# Patient Record
Sex: FEMALE | Race: WHITE
[De-identification: names, ages, dates, MRNs, and addresses within clinical notes are randomized per-mention and may not be internally consistent; named-entity substitution may affect disease eponyms.]

---

## 2020-03-02 ENCOUNTER — HOSPITAL ENCOUNTER (EMERGENCY)
Dept: HOSPITAL 62 - ER | Age: 20
Discharge: HOME | End: 2020-03-02
Payer: OTHER GOVERNMENT

## 2020-03-02 VITALS — DIASTOLIC BLOOD PRESSURE: 65 MMHG | SYSTOLIC BLOOD PRESSURE: 121 MMHG

## 2020-03-02 DIAGNOSIS — R11.0: ICD-10-CM

## 2020-03-02 DIAGNOSIS — R51: ICD-10-CM

## 2020-03-02 DIAGNOSIS — Z88.0: ICD-10-CM

## 2020-03-02 DIAGNOSIS — R10.13: Primary | ICD-10-CM

## 2020-03-02 LAB
ADD MANUAL DIFF: NO
ALBUMIN SERPL-MCNC: 4.4 G/DL (ref 3.5–5)
ALP SERPL-CCNC: 37 U/L (ref 38–126)
ANION GAP SERPL CALC-SCNC: 9 MMOL/L (ref 5–19)
APPEARANCE UR: CLEAR
APTT PPP: YELLOW S
AST SERPL-CCNC: 22 U/L (ref 14–36)
BASOPHILS # BLD AUTO: 0 10^3/UL (ref 0–0.2)
BASOPHILS NFR BLD AUTO: 0.6 % (ref 0–2)
BILIRUB DIRECT SERPL-MCNC: 0.2 MG/DL (ref 0–0.4)
BILIRUB SERPL-MCNC: 0.7 MG/DL (ref 0.2–1.3)
BILIRUB UR QL STRIP: NEGATIVE
BUN SERPL-MCNC: 18 MG/DL (ref 7–20)
CALCIUM: 9 MG/DL (ref 8.4–10.2)
CHLORIDE SERPL-SCNC: 103 MMOL/L (ref 98–107)
CO2 SERPL-SCNC: 26 MMOL/L (ref 22–30)
EOSINOPHIL # BLD AUTO: 0.1 10^3/UL (ref 0–0.6)
EOSINOPHIL NFR BLD AUTO: 1.3 % (ref 0–6)
ERYTHROCYTE [DISTWIDTH] IN BLOOD BY AUTOMATED COUNT: 12.8 % (ref 11.5–14)
GLUCOSE SERPL-MCNC: 77 MG/DL (ref 75–110)
GLUCOSE UR STRIP-MCNC: NEGATIVE MG/DL
HCT VFR BLD CALC: 37.9 % (ref 36–47)
HGB BLD-MCNC: 13.3 G/DL (ref 12–15.5)
KETONES UR STRIP-MCNC: 20 MG/DL
LYMPHOCYTES # BLD AUTO: 2.2 10^3/UL (ref 0.5–4.7)
LYMPHOCYTES NFR BLD AUTO: 38.9 % (ref 13–45)
MCH RBC QN AUTO: 32.2 PG (ref 27–33.4)
MCHC RBC AUTO-ENTMCNC: 35.1 G/DL (ref 32–36)
MCV RBC AUTO: 92 FL (ref 80–97)
MONOCYTES # BLD AUTO: 0.3 10^3/UL (ref 0.1–1.4)
MONOCYTES NFR BLD AUTO: 5.6 % (ref 3–13)
NEUTROPHILS # BLD AUTO: 3 10^3/UL (ref 1.7–8.2)
NEUTS SEG NFR BLD AUTO: 53.6 % (ref 42–78)
NITRITE UR QL STRIP: NEGATIVE
PH UR STRIP: 5 [PH] (ref 5–9)
PLATELET # BLD: 305 10^3/UL (ref 150–450)
POTASSIUM SERPL-SCNC: 3.6 MMOL/L (ref 3.6–5)
PROT SERPL-MCNC: 7.3 G/DL (ref 6.3–8.2)
PROT UR STRIP-MCNC: NEGATIVE MG/DL
RBC # BLD AUTO: 4.13 10^6/UL (ref 3.72–5.28)
SP GR UR STRIP: 1.03
TOTAL CELLS COUNTED % (AUTO): 100 %
UROBILINOGEN UR-MCNC: NEGATIVE MG/DL (ref ?–2)
WBC # BLD AUTO: 5.6 10^3/UL (ref 4–10.5)

## 2020-03-02 PROCEDURE — 96361 HYDRATE IV INFUSION ADD-ON: CPT

## 2020-03-02 PROCEDURE — 80053 COMPREHEN METABOLIC PANEL: CPT

## 2020-03-02 PROCEDURE — 84703 CHORIONIC GONADOTROPIN ASSAY: CPT

## 2020-03-02 PROCEDURE — 85025 COMPLETE CBC W/AUTO DIFF WBC: CPT

## 2020-03-02 PROCEDURE — 96374 THER/PROPH/DIAG INJ IV PUSH: CPT

## 2020-03-02 PROCEDURE — 81001 URINALYSIS AUTO W/SCOPE: CPT

## 2020-03-02 PROCEDURE — 83690 ASSAY OF LIPASE: CPT

## 2020-03-02 PROCEDURE — 99283 EMERGENCY DEPT VISIT LOW MDM: CPT

## 2020-03-02 PROCEDURE — 36415 COLL VENOUS BLD VENIPUNCTURE: CPT

## 2020-03-02 PROCEDURE — 96375 TX/PRO/DX INJ NEW DRUG ADDON: CPT

## 2020-03-02 NOTE — ER DOCUMENT REPORT
ED General





- General


Chief Complaint: Headache >24 hrs old


Stated Complaint: HEADACHE,ABDOMINAL PAIN,NAUSEA


Time Seen by Provider: 03/02/20 15:32


Primary Care Provider: 


TIGRE HOLLEY PA-C [Primary Care Provider] - Follow up in 3-5 days


Notes: 





18 y/o female with history of migraine headaches presents for left sided 

headache for 1 month and abdominal pain since this morning. Pt states migraine 

headache has associated nausea, photophobia, and phonophobia. States feels like 

usual migraine. Pt states abdominal pain started off generalized this morning 

and has progressed to epigastric area. Associated nausea. Denies vomiting, 

diarrhea, constipation, fever, urinary symptoms. 


TRAVEL OUTSIDE OF THE [place] IN LAST 30 DAYS: No





- Related Data


Allergies/Adverse Reactions: 


                                        





amoxicillin Allergy (Verified 03/02/20 15:30)


   








Home Medications: adderall.  oral contraceptive





Past Medical History





- General


Information source: Patient





- Social History


Smoking Status: Never Smoker


Frequency of alcohol use: None


Drug Abuse: Marijuana


Family History: Other - did not ask


Patient has suicidal ideation: No


Patient has homicidal ideation: No





Review of Systems





- Review of Systems


Notes: 





Constitutional: Negative for fever.


HENT: Negative for sore throat.


Eyes: Negative for visual changes.


Cardiovascular: Negative for chest pain.


Respiratory: Negative for shortness of breath.


Gastrointestinal: Positive for abdominal pain and nausea. Negative for vomiting 

or diarrhea.


Genitourinary: Negative for dysuria.


Musculoskeletal: Negative for back pain.


Skin: Negative for rash.


Neurological: Positive for headaches. Negative for weakness or numbness.





10 point ROS negative except as marked above and in HPI.





Physical Exam





- Vital signs


Vitals: 


                                        











Temp Pulse Resp BP Pulse Ox


 


 98.2 F   73   20   118/66   100 


 


 03/02/20 14:39  03/02/20 14:39  03/02/20 14:39  03/02/20 14:39  03/02/20 14:39














- Notes


Notes: 





GENERAL: Well-appearing, well-nourished and in no acute distress.


HEAD: Atraumatic, normocephalic.


EYES: Pupils equal round and reactive to light, extraocular movements intact, 

sclera anicteric, conjunctiva are normal.


NECK: Normal range of motion, supple without lymphadenopathy or JVD.


ABDOMEN: Soft, nontender.  No guarding, no rebound.  No masses appreciated.


EXTREMITIES: Normal range of motion, no pitting or edema.  No clubbing or 

cyanosis.


NEUROLOGICAL: Cranial nerves II through XII grossly intact.  Normal speech, 

normal gait. PERRLA. EOM intact. No facial droop. No tongue deviation.


PSYCH: Normal mood, normal affect.


SKIN: Warm, Dry, normal turgor, no rashes or lesions noted.





Course





- Re-evaluation


Re-evalutation: 





03/02/20 Presentation of a headache that appears to be most consistent with 

tension versus migrainous type headache.  Headache was not maximal in onset, 

patient has no focal neurologic deficits, no nuchal rigidity, vital signs within

normal limits, no papilledema, and patient is overall well in appearance.  Based

on clinical history and examination I do not suspect an acute subarachnoid 

hemorrhage, dural venous sinus thrombosis, acute meningitis, or intercranial 

mass.  Given my low clinical suspicion for any acute life-threatening etiology, 

I do not feel advanced neuro imaging or laboratory testing is indicated at this 

time.  Will proceed with headache cocktail and reassess.





Presentation for abdominal pain and nausea. Abd soft, nontender. PE is otherwise

unremarkable. Has improved with zofran. Labwork is unremarkable. 





03/02/20 18:42 HA is relieved. Pt given prescriptions for ibuprofen, bentyl, and

zofran. Pt given strict return precautions and close follow up with PCP. All 

questions/concerns addressed prior to discharge. All results were discussed with

pt prior to discharge.








- Vital Signs


Vital signs: 


                                        











Temp Pulse Resp BP Pulse Ox


 


 98.5 F   92   18   121/65   100 


 


 03/02/20 19:09  03/02/20 19:09  03/02/20 19:09  03/02/20 19:09  03/02/20 19:09














- Laboratory


Result Diagrams: 


                                 03/02/20 15:48





                                 03/02/20 15:48


Laboratory results interpreted by me: 


                                        











  03/02/20 03/02/20





  15:48 15:48


 


Alkaline Phosphatase  37 L 


 


Urine Ketones   20 H


 


Urine Ascorbic Acid   20 H














Discharge





- Discharge


Clinical Impression: 


 Nausea





Headache


Qualifiers:


 Headache type: unspecified Headache chronicity pattern: acute headache 

Intractability: not intractable Qualified Code(s): R51 - Headache





Abdominal pain


Qualifiers:


 Abdominal location: epigastric Qualified Code(s): R10.13 - Epigastric pain





Condition: Stable


Disposition: HOME, SELF-CARE


Instructions:  Abdominal Pain ([facility]), Headache ([facility])


Additional Instructions: 


Please take ibuprofen as prescribed. Take Zofran as needed for nausea/vomiting. 

Take Bentyl as needed for abdominal pain. Follow up with your primary care 

doctor or with one of the clinics listed in 3 to 5 days.  Return immediately to 

ER if you start having any worsening symptoms, including worsening headache, 

nausea/vomiting, fever, neck pain, neck stiffness, abdominal pain, chest pain, 

shortness of breath, or any other symptoms concerning to you.


Prescriptions: 


Ondansetron [Zofran Odt 4 mg Tablet] 4 mg PO Q4HP PRN #30 tab.rapdis


 PRN Reason: 


Dicyclomine HCl [Bentyl 20 mg Tablet] 20 mg PO QID #40 tablet


Ibuprofen [Motrin 800 mg Tablet] 800 mg PO Q8H PRN #30 tab


 PRN Reason: 


Forms:  Return to Work


Referrals: 


TIGRE HOLLEY PA-C [Primary Care Provider] - Follow up in 3-5 days

## 2020-03-02 NOTE — ER DOCUMENT REPORT
ED Medical Screen (RME)





- General


Chief Complaint: Headache


Stated Complaint: HEADACHE,ABDOMINAL PAIN,NAUSEA


Time Seen by Provider: 03/02/20 15:32


Primary Care Provider: 


TIGRE HOLLEY PA-C [Primary Care Provider] - Follow up as needed


Notes: 





Patient is a 19-year-old female who presents the emergency department with a 

chief complaint of a headache and mid upper abdominal pain.  Patient states that

she has had her headache for the past month.  States that the headache is in her

left forehead.  Patient states that she also feels nauseous.  She states that 

she is 1 week late for her menstrual cycle.





Exam: Soft, mildly tender mid upper abdomen.





I have greeted and performed a rapid initial assessment of this patient.  A 

comprehensive ED assessment and evaluation of the patient, analysis of test 

results and completion of medical decision making process will be conducted by 

an additional ED providers.





- Related Data


Allergies/Adverse Reactions: 


                                        





amoxicillin Allergy (Verified 03/02/20 15:30)


   











Physical Exam





- Vital signs


Vitals: 





                                        











Temp Pulse Resp BP Pulse Ox


 


 98.2 F   73   20   118/66   100 


 


 03/02/20 14:39  03/02/20 14:39  03/02/20 14:39  03/02/20 14:39  03/02/20 14:39














Course





- Vital Signs


Vital signs: 





                                        











Temp Pulse Resp BP Pulse Ox


 


 98.2 F   73   20   118/66   100 


 


 03/02/20 14:39  03/02/20 14:39  03/02/20 14:39  03/02/20 14:39  03/02/20 14:39














Doctor's Discharge





- Discharge


Referrals: 


TIGRE HOLLEY PA-C [Primary Care Provider] - Follow up as needed

## 2020-03-04 ENCOUNTER — HOSPITAL ENCOUNTER (EMERGENCY)
Dept: HOSPITAL 62 - ER | Age: 20
Discharge: HOME | End: 2020-03-04
Payer: OTHER GOVERNMENT

## 2020-03-04 VITALS — DIASTOLIC BLOOD PRESSURE: 57 MMHG | SYSTOLIC BLOOD PRESSURE: 111 MMHG

## 2020-03-04 DIAGNOSIS — R10.13: ICD-10-CM

## 2020-03-04 DIAGNOSIS — R11.0: ICD-10-CM

## 2020-03-04 DIAGNOSIS — Z88.0: ICD-10-CM

## 2020-03-04 DIAGNOSIS — R51: Primary | ICD-10-CM

## 2020-03-04 DIAGNOSIS — M94.0: ICD-10-CM

## 2020-03-04 LAB
ADD MANUAL DIFF: NO
ALBUMIN SERPL-MCNC: 4.6 G/DL (ref 3.5–5)
ALP SERPL-CCNC: 43 U/L (ref 38–126)
ANION GAP SERPL CALC-SCNC: 12 MMOL/L (ref 5–19)
AST SERPL-CCNC: 21 U/L (ref 14–36)
BASOPHILS # BLD AUTO: 0 10^3/UL (ref 0–0.2)
BASOPHILS NFR BLD AUTO: 0.6 % (ref 0–2)
BILIRUB DIRECT SERPL-MCNC: 0.2 MG/DL (ref 0–0.4)
BILIRUB SERPL-MCNC: 0.7 MG/DL (ref 0.2–1.3)
BUN SERPL-MCNC: 9 MG/DL (ref 7–20)
CALCIUM: 9.4 MG/DL (ref 8.4–10.2)
CHLORIDE SERPL-SCNC: 101 MMOL/L (ref 98–107)
CO2 SERPL-SCNC: 24 MMOL/L (ref 22–30)
D DIMER PPP FEU-MCNC: < 0.27 UG/ML (ref 0–0.5)
EOSINOPHIL # BLD AUTO: 0.1 10^3/UL (ref 0–0.6)
EOSINOPHIL NFR BLD AUTO: 1.4 % (ref 0–6)
ERYTHROCYTE [DISTWIDTH] IN BLOOD BY AUTOMATED COUNT: 12.6 % (ref 11.5–14)
GLUCOSE SERPL-MCNC: 74 MG/DL (ref 75–110)
HCT VFR BLD CALC: 39.6 % (ref 36–47)
HGB BLD-MCNC: 14 G/DL (ref 12–15.5)
INR PPP: 0.98
LYMPHOCYTES # BLD AUTO: 1.8 10^3/UL (ref 0.5–4.7)
LYMPHOCYTES NFR BLD AUTO: 31.8 % (ref 13–45)
MCH RBC QN AUTO: 32.4 PG (ref 27–33.4)
MCHC RBC AUTO-ENTMCNC: 35.5 G/DL (ref 32–36)
MCV RBC AUTO: 91 FL (ref 80–97)
MONOCYTES # BLD AUTO: 0.3 10^3/UL (ref 0.1–1.4)
MONOCYTES NFR BLD AUTO: 5 % (ref 3–13)
NEUTROPHILS # BLD AUTO: 3.4 10^3/UL (ref 1.7–8.2)
NEUTS SEG NFR BLD AUTO: 61.2 % (ref 42–78)
PLATELET # BLD: 291 10^3/UL (ref 150–450)
POTASSIUM SERPL-SCNC: 4 MMOL/L (ref 3.6–5)
PROT SERPL-MCNC: 7.4 G/DL (ref 6.3–8.2)
PROTHROMBIN TIME: 13 SEC (ref 11.4–15.4)
RBC # BLD AUTO: 4.33 10^6/UL (ref 3.72–5.28)
TOTAL CELLS COUNTED % (AUTO): 100 %
WBC # BLD AUTO: 5.6 10^3/UL (ref 4–10.5)

## 2020-03-04 PROCEDURE — 84703 CHORIONIC GONADOTROPIN ASSAY: CPT

## 2020-03-04 PROCEDURE — 85025 COMPLETE CBC W/AUTO DIFF WBC: CPT

## 2020-03-04 PROCEDURE — 71046 X-RAY EXAM CHEST 2 VIEWS: CPT

## 2020-03-04 PROCEDURE — 36415 COLL VENOUS BLD VENIPUNCTURE: CPT

## 2020-03-04 PROCEDURE — 93005 ELECTROCARDIOGRAM TRACING: CPT

## 2020-03-04 PROCEDURE — 99285 EMERGENCY DEPT VISIT HI MDM: CPT

## 2020-03-04 PROCEDURE — 93010 ELECTROCARDIOGRAM REPORT: CPT

## 2020-03-04 PROCEDURE — 80053 COMPREHEN METABOLIC PANEL: CPT

## 2020-03-04 PROCEDURE — 85379 FIBRIN DEGRADATION QUANT: CPT

## 2020-03-04 PROCEDURE — 85610 PROTHROMBIN TIME: CPT

## 2020-03-04 NOTE — EKG REPORT
SEVERITY:- BORDERLINE ECG -

SINUS RHYTHM

PROBABLE LEFT ATRIAL ABNORMALITY

:

Confirmed by: Tyshawn Hernadez MD 04-Mar-2020 18:40:34

## 2020-03-04 NOTE — ER DOCUMENT REPORT
ED Medical Screen (RME)





- General


Chief Complaint: Chest Pain


Stated Complaint: CHEST PAIN


Time Seen by Provider: 03/04/20 16:41


Primary Care Provider: 


TIGRE HOLLEY PA-C [Primary Care Provider] - Follow up as needed


Notes: 





HPI: 19-year-old female presenting primarily for intermittent sharp midsternal 

chest pain that changes with deep breathing.  Began last night and worsened 

today.  No fever.  No recent cough.  Patient also states she was seen 2 days ago

for headaches, was prescribed Motrin which does help the headaches but the 

headaches have become recurrent





I have greeted and performed a rapid initial assessment of this patient.  A 

comprehensive ED assessment and evaluation of the patient, analysis of test 

results and completion of the medical decision making process will be conducted 

by additional ED providers








PHYSICAL EXAMINATION:





GENERAL: Well-appearing, well-nourished and in no acute distress.


HEAD: Atraumatic, normocephalic.


EYES:  sclera anicteric, conjunctiva are normal.


ENT: Moist mucous membranes.


NECK: Normal range of motion


LUNGS: Normal work of breathing, clear to auscultation


HEART: 2+ radial pulses bilaterally, regular rate and rhythm


ABD: limited by positioning for exam in triage.  


EXTREMITIES: no pitting or edema.  No cyanosis.


NEUROLOGICAL: No focal neurological deficits. Moves all extremities 

spontaneously and on command.


PSYCH: Normal mood, normal affect.


SKIN: Warm, Dry, normal turgor, no rashes or lesions noted.








TRAVEL OUTSIDE OF THE [place] IN LAST 30 DAYS: No





- Related Data


Allergies/Adverse Reactions: 


                                        





amoxicillin Allergy (Verified 03/04/20 16:35)


   











Past Medical History





- Social History


Chew tobacco use (# tins/day): No


Frequency of alcohol use: None


Drug Abuse: None





Physical Exam





- Vital signs


Vitals: 





                                        











Temp Pulse Resp BP Pulse Ox


 


 98.5 F   89   16   118/74   98 


 


 03/04/20 15:34  03/04/20 15:34  03/04/20 15:34  03/04/20 15:34  03/04/20 15:34














Course





- Vital Signs


Vital signs: 





                                        











Temp Pulse Resp BP Pulse Ox


 


 98.5 F   89   16   118/74   98 


 


 03/04/20 15:34  03/04/20 15:34  03/04/20 15:34  03/04/20 15:34  03/04/20 15:34














Doctor's Discharge





- Discharge


Referrals: 


TIGRE HOLLEY PA-C [Primary Care Provider] - Follow up as needed

## 2020-03-04 NOTE — RADIOLOGY REPORT (SQ)
EXAM DESCRIPTION:  CHEST 2 VIEWS



COMPLETED DATE/TIME:  3/4/2020 4:55 pm



REASON FOR STUDY:  chest pain



COMPARISON:  None.



EXAM PARAMETERS:  NUMBER OF VIEWS: two views

TECHNIQUE: Digital Frontal and Lateral radiographic views of the chest acquired.

RADIATION DOSE: NA

LIMITATIONS: none



FINDINGS:  LUNGS AND PLEURA: No opacities, masses or pneumothorax. No pleural effusion.

MEDIASTINUM AND HILAR STRUCTURES: No masses or contour abnormalities.

HEART AND VASCULAR STRUCTURES: Heart normal size.  No evidence for failure.

BONES: No acute findings.

HARDWARE: None in the chest.

OTHER: No other significant finding.



IMPRESSION:  1. NO ACUTE RADIOGRAPHIC FINDING IN THE CHEST.



TECHNICAL DOCUMENTATION:  JOB ID:  [id number]

 2011 [employer]- All Rights Reserved



Reading location - IP/workstation name: [email or web address]

## 2020-03-04 NOTE — ER DOCUMENT REPORT
ED General





- General


Chief Complaint: Chest Pain


Stated Complaint: CHEST PAIN


Time Seen by Provider: 03/04/20 16:41


Primary Care Provider: 


TIGRE HOLLEY PA-C [Primary Care Provider] - Follow up as needed


Notes: 





39-year-old female presents emergency department complaining of 2 days of sharp 

stabbing chest pain that worsens with a deep breath and with movement associated

with epigastric pain for the past 2 days as well.  Patient does have a history 

of GERD, states that she used to take daily Zantac but is now taking it 

intermittently.  States that she has been taking please use ibuprofen 800 mg 

every 8 hours as needed for pain.  You may also use acetaminophen 1000 mg every 

6 hours as needed for pain.  Please be aware that many medications contain 

acetaminophen, do not exceed a total of 1000 mg of acetaminophen every 6 hours. 

Daily for the past month for headaches and over the past 2 to 3 days has been t

aking it every 8 hours.  States it does relieve her headache but her headache 

comes back 8 hours later.  Patient was seen here 2 days ago.  Denies any change 

in her headache since it came back after being seen.  Denies fevers, chills, 

vomiting, diarrhea.  Denies blurry vision, numbness, tingling.  Admits mild 

nausea.


TRAVEL OUTSIDE OF THE [place] IN LAST 30 DAYS: No





- Related Data


Allergies/Adverse Reactions: 


                                        





amoxicillin Allergy (Verified 03/04/20 16:35)


   











Past Medical History





- General


Information source: Patient





- Social History


Smoking Status: Never Smoker


Chew tobacco use (# tins/day): No


Frequency of alcohol use: None


Drug Abuse: Marijuana


Family History: None


Patient has suicidal ideation: No


Patient has homicidal ideation: No





Review of Systems





- Review of Systems


Constitutional: No symptoms reported


EENT: No symptoms reported


Cardiovascular: See HPI, Chest pain


Respiratory: No symptoms reported


Gastrointestinal: See HPI, Abdominal pain, Nausea


Neurological/Psychological: See HPI, Headaches


-: Yes All other systems reviewed and negative





Physical Exam





- Vital signs


Vitals: 





                                        











Temp Pulse Resp BP Pulse Ox


 


 98.5 F   89   16   118/74   98 


 


 03/04/20 15:34  03/04/20 15:34  03/04/20 15:34  03/04/20 15:34  03/04/20 15:34











Interpretation: Normal





- Notes


Notes: 





GENERAL: Alert, interacts well.  No acute distress.


HEAD: Normocephalic, atraumatic, left frontal sinus tenderness to percussion.  


EYES: Pupils equal, round and reactive to light, extraocular movements intact.


ENT: Oral mucosa moist, tongue midline.  Tympanic membranes intact, slight 

injection of the left tympanic membrane.


NECK: Full range of motion, supple, trachea midline.


LUNGS: Clear to auscultation bilaterally, no wheezes, rales or rhonchi, no 

respiratory distress.


HEART: Regular rate and rhythm, no murmurs, gallops, rubs.  


ABDOMEN: Soft, nontender, nondistended, bowel sounds present in all 4 quadrants.

 


EXTREMITIES: Moves all 4 extremities spontaneously, no edema, radial and 

dorsalis pedis pulses 2/4 bilaterally.  No cyanosis.  


NEUROLOGICAL: Alert and oriented x3, normal speech, cranial nerves II through 

XII grossly intact.


PSYCH: Normal mood, normal affect.


SKIN: Warm, Dry, normal turgor, no rashes or lesions noted.





Course





- Re-evaluation


Re-evalutation: 





03/04/20 20:09


CBC unremarkable, coags normal, d-dimer negative, CMP unremarkable, pregnancy 

test negative, EKG nonischemic, chest x-ray negative.


03/04/20 20:09


Suspect that the daily ibuprofen use has caused gastritis causing her epigastric

abdominal pain.  Patient will be treated with over-the-counter antacid such as 

Zantac.





Patient's chest pain is perfectly reproducible on palpation, complains of pain 

with deep breath, consistent with costochondritis.  Treat with steroids.  

Discussed with patient the possibility that the steroids will worsen her 

gastritis, patient is aware that she could consider taking the steroids after 

she is done ranitidine for 2 weeks.  Patient would rather use the steroids in 

conjunction with ranitidine to try and get a sooner resolution of her chest pain

.  Patient also aware that her headache may improve as she is likely having a 

rebound headache.





- Vital Signs


Vital signs: 





                                        











Temp Pulse Resp BP Pulse Ox


 


 98.5 F   89   16   118/74   98 


 


 03/04/20 15:34  03/04/20 15:34  03/04/20 15:34  03/04/20 15:34  03/04/20 15:34














- Laboratory


Result Diagrams: 


                                 03/04/20 17:05





                                 03/04/20 17:05


Laboratory results interpreted by me: 





                                        











  03/04/20





  17:05


 


Glucose  74 L














- EKG Interpretation by Me


Additional EKG results interpreted by me: 





03/04/20 20:09


EKG shows sinus rhythm rate 95, normal axis, normal intervals, no ST segment 

elevations or depressions, no T wave inversions per my interpretation.





Discharge





- Discharge


Clinical Impression: 


 Chronic daily headache, Epigastric abdominal pain, Costochondritis, acute





Condition: Stable


Disposition: HOME, SELF-CARE


Additional Instructions: 


Costochondritis





     Your chest pain is coming from the rib cartilages in the chest wall. This 

is often caused by subtle straining of the ribs near the breastbone. The strain 

can occur from a mild injury, coughing or sneezing with a "cold," vigorous 

vomiting, or even from rib compression while sleeping. Often the pain doesn't 

begin until a couple of days after the strain.


     Persons with arthritis are especially prone to this type of pain, due to 

inflammation of the cartilage joints near the breast bone. But often, there is 

no clear reason why it happens.


     Rest from strenuous physical activity.  This kind of chest pain is usually 

made worse by movement of the chest.  Depending on the symptoms, we may 

prescribe medicine for pain and inflammation. Apply gentle warmth to the painful

area for 15 minutes every hour or two.


     You should call contact the doctor immediately if things change. Further 

evaluation is needed if you develop a fever or cough, if the nature of the pain 

changes, or if you become short of breath.





Gastritis





     You have an inflammation of the stomach called gastritis.  This commonly 

causes upper abdominal pain, nausea, and vomiting.  In severe cases, bleeding of

the stomach lining can occur.  Gastritis can be caused by bacteria or viruses, 

alcohol, or stomach-irritating drugs.


     Begin with sips of clear liquids.  Take increasing amounts of fluid over 

the first 24 hours.  Then start small amounts of bland foods (such as dry toast,

applesauce, mashed potato).  Gradually resume your usual diet.


     Please take Zantac or other similar over-the-counter antacid 1 tablet twice

a day for the next 2 weeks.  Avoid aspirin, Profen, caffeine, tobacco, and 

alcohol.


     If the abdominal pain worsens, or there is evidence of major bleeding in 

the stomach (such as black, tarry stool, bloody or black vomit, or lighth

eadedness), you should return immediately.  Call the doctor if you aren't 

improved in 24 to 36 hours.





Please consider seeing your primary care physician regarding your chronic daily 

headaches.  The steroids may help to improve them.  You should also try nasal 

saline rinses.





Please use nasal saline rinses such as a NetiPot or NeilMed Sinus Rinses.  

Please also use over-the-counter decongestants according to their directions on 

the box such as Sudafed during the day and Benadryl at night.





Referrals: 


TIGRE HOLLEY PA-C [Primary Care Provider] - Follow up as needed